# Patient Record
(demographics unavailable — no encounter records)

---

## 2024-10-25 NOTE — PHYSICAL EXAM
clothing [No Acute Distress] : no acute distress [Well Nourished] : well nourished [No Deformities] : no deformities [Low Lying Soft Palate] : low lying soft palate [II] : Mallampati Class: II [Normal Appearance] : normal appearance [No Neck Mass] : no neck mass [Normal Rate/Rhythm] : normal rate/rhythm [Normal S1, S2] : normal s1, s2 [No Murmurs] : no murmurs [No Resp Distress] : no resp distress [Clear to Auscultation Bilaterally] : clear to auscultation bilaterally [No Clubbing] : no clubbing [No Cyanosis] : no cyanosis [No Edema] : no edema [No Focal Deficits] : no focal deficits [Oriented x3] : oriented x3 [Normal Affect] : normal affect

## 2024-10-25 NOTE — ASSESSMENT
[FreeTextEntry1] : This is a 57-year-old male with a significant past medical history of WTC exposure, GERD, reactive airway disease, and JAIMIE who comes in for a follow up.  1. Mild Obstructive Sleep Apnea: The patient has a mild form of obstructive sleep apnea with an Apnea-Hypopnea Index (AHI) of 10.2 per hour, indicating an average of 10 respiratory events per hour. The condition is positional, with a higher AHI of 21.9 when on the back and a lower AHI of 5.5 on the left side and 4.8 on the right side. Oxygen saturation dropped to a low of 82% with only 3% of the night spent below 90%.   2. Weight Management: The patient has successfully lost weight, which has positively impacted snoring and potentially the severity of sleep apnea. Continued weight loss is encouraged to further improve symptoms and potentially normalize the AHI.  3. Reactive airway disease: The patient reports being in the beginning stages of COPD but is not on any medications. Pulmonary function tests show an FEV1 of 89% and a ratio of 76%, indicating no obstructive ventilatory defect. The patient experiences exercise-induced wheezing, suggesting a reactive airway disease. There is also a positive bronchodilator response on PFTs.   4. GERD: The patient suffers from GERD and is on pantoprazole. He reports no issues with the medication affecting his breathing or other conditions.  Plan: - Discuss the benefits of positional therapy to mitigate sleep apnea symptoms, particularly avoiding supine sleep. - Encourage continued weight loss and maintenance to further reduce apnea events and improve overall health. - Consider the use of a mandibular advancement device or positive airway pressure therapy if the patient experiences symptoms such as daytime sleepiness, multiple awakenings, or feeling unrefreshed, despite the mild nature of the sleep apnea. - Advise on the potential impact of the dental guard on airway obstruction and snoring, especially when nasal congestion is present.  Follow up: The patient will follow up in case of worsening symptoms, significant weight gain, or if the patient decides to pursue treatment for sleep apnea. Otherwise, routine follow-up may not be necessary unless new symptoms arise.

## 2024-10-25 NOTE — REASON FOR VISIT
[Home] : at home, [unfilled] , at the time of the visit. [Medical Office: ([facility])___] : at the medical office located in  [Patient] : the patient [Initial] : an initial visit [Sleep Apnea] : sleep apnea

## 2024-10-25 NOTE — REVIEW OF SYSTEMS
[Recent Wt Loss (___ Lbs)] : ~T recent [unfilled] lb weight loss [GERD] : gerd [Nocturia] : nocturia [Negative] : Psychiatric

## 2024-10-25 NOTE — HISTORY OF PRESENT ILLNESS
[Former] : former [< 20 pack-years] : < 20 pack-years [Never] : never [Obstructive Sleep Apnea] : obstructive sleep apnea [Awakes with Dry Mouth] : awakes with dry mouth [Snoring] : snoring [Witnessed Apneas] : witnessed apneas [Home] : home [TextBox_4] : This is a 57-year-old male with a significant past medical history of WTC exposure, GERD, reactive airway disease, and JAIMIE who comes in for a follow up.  The patient reports a generally good experience with the sleep study, despite some initial discomfort due to sleeping with the monitoring device, particularly when sleeping on the stomach or partially on the left side of the face. The patient has a history of snoring and has noticed a decrease in snoring after losing 22 pounds.  Of note: The patient is a former  who participated in the [employer] recovery in 2001. He reports that his breathing was initially horrible due to exposure to contaminants, resulting in scarring in his sinuses. Over the years, his breathing has improved, and he undergoes periodic tests to monitor his condition. He experiences wheezing sometimes when walking and has been trying to lose weight to improve his breathing. In the last four to five months, he has lost 18 pounds and walks two miles a day. He believes his breathing has improved due to weight loss and exercise. He reports being in the beginning stages of COPD but is not on any medications for it. He experiences shortness of breath when walking at a fast pace (16-17 minute mile). He used an albuterol inhaler after 9-11, which helped with shortness of breath.  The patient also reports snoring, and his wife has observed this. He was referred for a sleep study. He goes to bed between 11:30 PM and midnight and suffers from GERD, for which he takes pantoprazole. It takes him 30-45 minutes to fall asleep, and he wakes up once to a few times during the night. He wakes up at 7:30 AM and usually feels okay. He has had an endoscopy and colonoscopy, during which the anesthesiologist noted he stops breathing and had to use a nasal trumpet.  He has a history of smoking from ages 18 to 28, with a break of three years in between, and has not smoked since 1998. He smoked half a pack to a pack a day. He does not use electronic cigarettes or marijuana. He drinks 60-80 ounces of water and 25 ounces of coffee daily. He has noticed some dizziness during pulmonary function tests but feels it has improved with walking. [TextBox_11] : 0.5 [TextBox_13] : 7 [Awakes Unrefreshed] : does not awaken unrefreshed [Awakes with Headache] : does not awaken with headache [Nonrestorative Sleep] : denies nonrestorative sleep [Recent  Weight Gain] : no recent weight gain [Unusual Sleep Behavior] : no unusual sleep behavior [Hypersomnolence] : no hypersomnolence [Cataplexy] : no cataplexy [Hypnagogic Hallucinations] : no hypnagogic hallucinations [Lower Extremity Discomfort] : does not have lower extremity discomfort [Irresistible urge to move legs] : does not have irresistible urge to move legs [LE discomfort relieved by movement] : denies lower extremity discomfort relieved by movement [Late day/ Evening symptoms] : does not have late day/ evening symptoms [Sleep Disturbances due to LE symptoms] : denies sleep disturbances due to lower extremity symptoms [TextBox_77] : 11:30pm [TextBox_79] : 7:30am [TextBox_81] : 30 [TextBox_89] : 0-1 [TextBox_100] : 10/2024 [TextBox_108] : 10.2 [TextBox_112] : 3.3 [TextBox_116] : 82 [ESS] : 3